# Patient Record
Sex: FEMALE | Race: WHITE
[De-identification: names, ages, dates, MRNs, and addresses within clinical notes are randomized per-mention and may not be internally consistent; named-entity substitution may affect disease eponyms.]

---

## 2018-01-04 ENCOUNTER — HOSPITAL ENCOUNTER (OUTPATIENT)
Dept: HOSPITAL 96 - M.RAD | Age: 58
End: 2018-01-04
Attending: INTERNAL MEDICINE
Payer: COMMERCIAL

## 2018-01-04 DIAGNOSIS — Z12.31: Primary | ICD-10-CM

## 2018-03-05 ENCOUNTER — HOSPITAL ENCOUNTER (OUTPATIENT)
Dept: HOSPITAL 96 - M.CRD | Age: 58
End: 2018-03-05
Attending: INTERNAL MEDICINE
Payer: COMMERCIAL

## 2018-03-05 DIAGNOSIS — Z13.6: Primary | ICD-10-CM

## 2018-03-05 DIAGNOSIS — I25.10: ICD-10-CM

## 2018-03-05 NOTE — EXE
[place] [address]
Phone:  [phone]                     STRESS ECHOCARDIOGRAM         
_______________________________________________________________________________
 
Name:         TOÑITO TRACY               Room:                     REG CLI
M.R.#:    [id number]     Account #:     [id number]  
Admission:    03/05/18    Attend Phys:   Brody Love,
Discharge:                Date of Birth: [birth year]  
Date of Service: 03/05/18 1345  Report #:      [id number]
        [id number]
_______________________________________________________________________________
THIS REPORT FOR:  //name//                      
 
 
--------------- APPROVED REPORT --------------
 
 
Exam:  Stress Echocardiogram
Indication: CAD , Calcium Score
Patient Location: Out-Patient
Stress Nurse: Kristy Johnson RN
Supervising Physician: Matt Melendez MD
Status: routine
 
Ht: 5 ft 6 in      
HR: 58 bpm       BP: 123/74 mmHg 
 
Medical History
Medical History: CAD
Cardiac Risk Factors: FHX of CAD, Tobacco History 
(Former)
 
Procedure
The patient underwent an Exercise Stress Test using the Yury 
Protocol. Blood pressure, heart rate, and EKG were monitored.
An Echocardiogram was performed by technician in four stages in quad 
fashion.  At peak stress, four selected images were obtained and 
placed side by side with resting images for comparison.
 
Stress Test Details
Stress Test:  Exercise stress testing was performed using a Yury 
protocol.      
HR           
Resting HR:            58 bpm   Max Heart Rate (APMHR): 160 bpm  
Max HR Achieved:  162 bpm   Target HR (85% APMHR): 136 bpm  
% of APMHR:         101         
Recovery HR:            95 bpm        
HR response to stress: Normal HR response to stress      
 
BP           
Resting BP:  123/74 mmHg        
Max BP:       172/92 mmHg        
Recovery BP:       130/87 mmHg        
 
ECG           
Resting ECG:  Sinus Rhythm        
Stress ECG:     Sinus Rhythm, nonspecific ST-T abnormalities      
 
 
[place] [address]
Phone:  [phone]                     STRESS ECHOCARDIOGRAM         
_______________________________________________________________________________
 
Name:         TOÑITO TRACY               Room:                     REG CLI
M.R.#:    [id number]     Account #:     [id number]  
Admission:    03/05/18    Attend Phys:   Brody Love,
Discharge:                Date of Birth: [birth year]  
Date of Service: 03/05/18 1345  Report #:      [id number]
        [id number]
_______________________________________________________________________________
ST Change: Upsloping ST depression       
Maximum ST Deviation: 0.5 mm        
Recovery ECG: Sinus Rhythm, nonspecific ST-T 
abnormalities      
Recovery ST Change: Horizontal ST depression      
Recovery ST Deviation: 0.5 mm        
 
Clinical
Reason for Termination: Completed protocol, Leg pain
Exercise duration: 7 min 49 sec
Highest Stage Achieved: Stage 2: 2.5 mph at 12% grade. 
Exercise capacity: 9.84 METs
 
Pre-Stress Echo
The resting Echocardiogram showed normal left ventricular 
contractility with an estimated Ejection Fraction of about 60-65%. 
 
Post-Stress Echo
The stress Echocardiogram showed normal left ventricular 
contractility with an estimated Ejection Fraction of about 65-70%. 
 
Conclusion
Clinical Response:  Non-ischemic
Exercise Capacity:  Average
Stress ECG Response:  Equivocal
Stress Echo Images:  Non-ischemic
low risk stress echo for future cardiac events
 
Other Information
Study Quality: Good
 
&lt;Conclusion&gt;
low risk stress echo for future cardiac events
 
 
 
 
 
 
 
 
 
 
 
  <ELECTRONICALLY SIGNED>
                                           By: Matt Melendez MD, FACC      
  03/05/18     1345
D: 03/05/18 1345   _____________________________________
T: 03/05/18 1345   Matt Melendez MD, FACC        /INF